# Patient Record
Sex: MALE | Race: WHITE
[De-identification: names, ages, dates, MRNs, and addresses within clinical notes are randomized per-mention and may not be internally consistent; named-entity substitution may affect disease eponyms.]

---

## 2018-12-19 ENCOUNTER — HOSPITAL ENCOUNTER (OUTPATIENT)
Dept: HOSPITAL 96 - M.ULTRA | Age: 73
End: 2018-12-19
Attending: FAMILY MEDICINE
Payer: MEDICARE

## 2018-12-19 DIAGNOSIS — R10.11: Primary | ICD-10-CM

## 2018-12-27 ENCOUNTER — HOSPITAL ENCOUNTER (OUTPATIENT)
Dept: HOSPITAL 96 - M.LAB | Age: 73
End: 2018-12-27
Attending: FAMILY MEDICINE
Payer: MEDICARE

## 2018-12-27 DIAGNOSIS — K76.0: Primary | ICD-10-CM

## 2018-12-27 DIAGNOSIS — R10.11: ICD-10-CM

## 2018-12-27 DIAGNOSIS — R93.5: ICD-10-CM

## 2018-12-27 DIAGNOSIS — K57.30: ICD-10-CM

## 2018-12-27 LAB
BUN SERPL-MCNC: 9 MG/DL (ref 7–18)
CREAT SERPL-MCNC: 0.7 MG/DL (ref 0.6–1.3)

## 2019-01-10 ENCOUNTER — HOSPITAL ENCOUNTER (OUTPATIENT)
Dept: HOSPITAL 96 - M.NUC | Age: 74
End: 2019-01-10
Attending: FAMILY MEDICINE
Payer: MEDICARE

## 2019-01-10 DIAGNOSIS — R93.5: ICD-10-CM

## 2019-01-10 DIAGNOSIS — R10.11: Primary | ICD-10-CM

## 2019-04-17 ENCOUNTER — HOSPITAL ENCOUNTER (OUTPATIENT)
Dept: HOSPITAL 96 - M.NUC | Age: 74
End: 2019-04-17
Attending: NURSE PRACTITIONER
Payer: MEDICARE

## 2019-04-17 DIAGNOSIS — R14.0: Primary | ICD-10-CM

## 2019-09-25 ENCOUNTER — HOSPITAL ENCOUNTER (EMERGENCY)
Dept: HOSPITAL 96 - M.ERS | Age: 74
Discharge: HOME | End: 2019-09-25
Payer: MEDICARE

## 2019-09-25 VITALS — HEIGHT: 72 IN | WEIGHT: 189.99 LBS | BODY MASS INDEX: 25.73 KG/M2

## 2019-09-25 VITALS — DIASTOLIC BLOOD PRESSURE: 79 MMHG | SYSTOLIC BLOOD PRESSURE: 108 MMHG
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
## 2019-09-25 DIAGNOSIS — Z98.890: ICD-10-CM

## 2019-09-25 DIAGNOSIS — M06.9: ICD-10-CM

## 2019-09-25 DIAGNOSIS — S16.1XXA: Primary | ICD-10-CM

## 2019-09-25 DIAGNOSIS — R42: ICD-10-CM
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
## 2019-09-25 LAB
ABSOLUTE BASOPHILS: 0.1 THOU/UL (ref 0–0.2)
ABSOLUTE EOSINOPHILS: 0 THOU/UL (ref 0–0.7)
ABSOLUTE MONOCYTES: 0.5 THOU/UL (ref 0–1.2)
ALBUMIN SERPL-MCNC: 3.5 G/DL (ref 3.4–5)
ALP SERPL-CCNC: 50 U/L (ref 46–116)
ALT SERPL-CCNC: 43 U/L (ref 30–65)
ANION GAP SERPL CALC-SCNC: 9 MMOL/L (ref 7–16)
APTT BLD: 22.7 SECONDS (ref 25–31.3)
AST SERPL-CCNC: 23 U/L (ref 15–37)
BASOPHILS NFR BLD AUTO: 1 %
BILIRUB SERPL-MCNC: 0.8 MG/DL
BUN SERPL-MCNC: 12 MG/DL (ref 7–18)
CALCIUM SERPL-MCNC: 8.8 MG/DL (ref 8.5–10.1)
CHLORIDE SERPL-SCNC: 103 MMOL/L (ref 98–107)
CO2 SERPL-SCNC: 28 MMOL/L (ref 21–32)
CREAT SERPL-MCNC: 0.6 MG/DL (ref 0.6–1.3)
EOSINOPHIL NFR BLD: 0.5 %
GLUCOSE SERPL-MCNC: 136 MG/DL (ref 70–99)
GRANULOCYTES NFR BLD MANUAL: 64.8 %
HCT VFR BLD CALC: 42.7 % (ref 42–52)
HGB BLD-MCNC: 14.4 GM/DL (ref 14–18)
INR PPP: 1
LYMPHOCYTES # BLD: 2.1 THOU/UL (ref 0.8–5.3)
LYMPHOCYTES NFR BLD AUTO: 27 %
MAGNESIUM SERPL-MCNC: 2.1 MG/DL (ref 1.8–2.4)
MCH RBC QN AUTO: 32.2 PG (ref 26–34)
MCHC RBC AUTO-ENTMCNC: 33.8 G/DL (ref 28–37)
MCV RBC: 95.5 FL (ref 80–100)
MONOCYTES NFR BLD: 6.7 %
MPV: 10 FL. (ref 7.2–11.1)
NEUTROPHILS # BLD: 5 THOU/UL (ref 1.6–8.1)
NT-PRO BRAIN NAT PEPTIDE: 65 PG/ML (ref ?–300)
NUCLEATED RBCS: 0 /100WBC
PLATELET COUNT*: 176 THOU/UL (ref 150–400)
POTASSIUM SERPL-SCNC: 3.6 MMOL/L (ref 3.5–5.1)
PROT SERPL-MCNC: 7 G/DL (ref 6.4–8.2)
PROTHROMBIN TIME: 10 SECONDS (ref 9.2–11.5)
RBC # BLD AUTO: 4.48 MIL/UL (ref 4.5–6)
RDW-CV: 15 % (ref 10.5–14.5)
SODIUM SERPL-SCNC: 140 MMOL/L (ref 136–145)
TROPONIN-I LEVEL: <0.06 NG/ML (ref ?–0.06)
WBC # BLD AUTO: 7.8 THOU/UL (ref 4–11)

## 2019-09-26 NOTE — EKG
[place] [address]
Phone:  [phone]                     ELECTROCARDIOGRAM REPORT      
_______________________________________________________________________________
 
Name:       LEONARDO PELAYO JR         [id number]      Account #:      [id number]  
Admission:  19     Attend Phys:                         
Discharge:  19     Date of Birth:  [birth year]  
         Report #: [id number]
    [id number]
_______________________________________________________________________________
THIS REPORT FOR:  //name//                      
 
                         [facility] ED
                                       
Test Date:    2019               Test Time:    15:56:13
Pat Name:     LEONARDO PELAYO              Department:   
Patient ID:   [id number]            Room:          
Gender:       M                        Technician:   
:          1945               Requested By: Yusuf Turk
Order Number: [id number] MD:   Mario Hand
                                 Measurements
Intervals                              Axis          
Rate:         71                       P:            43
MT:           163                      QRS:          -16
QRSD:         113                      T:            16
QT:           430                                    
QTc:          468                                    
                           Interpretive Statements
Sinus rhythm
Borderline intraventricular conduction delay
Borderline ST elevation, anterior leads
Compared to ECG 2017 08:21:31
ST (T wave) deviation now present
 
Electronically Signed On 2019 15:51:42 CDT by Mario Hand
[email or web address]
 
 
 
 
 
 
 
 
 
 
 
 
 
 
 
 
 
  <ELECTRONICALLY SIGNED>
                                           By: Mario Hand MD, [facility]   
  19     1551
D: 19 1556   _____________________________________
T: 19 1556   Mario Hand MD, [facility]     /EPI

## 2019-12-19 ENCOUNTER — APPOINTMENT (RX ONLY)
Dept: URBAN - METROPOLITAN AREA CLINIC 142 | Facility: CLINIC | Age: 74
Setting detail: DERMATOLOGY
End: 2019-12-19

## 2019-12-19 DIAGNOSIS — L82.1 OTHER SEBORRHEIC KERATOSIS: ICD-10-CM

## 2019-12-19 DIAGNOSIS — L21.8 OTHER SEBORRHEIC DERMATITIS: ICD-10-CM

## 2019-12-19 DIAGNOSIS — L57.0 ACTINIC KERATOSIS: ICD-10-CM

## 2019-12-19 PROBLEM — M12.9 ARTHROPATHY, UNSPECIFIED: Status: ACTIVE | Noted: 2019-12-19

## 2019-12-19 PROCEDURE — 17000 DESTRUCT PREMALG LESION: CPT

## 2019-12-19 PROCEDURE — 17003 DESTRUCT PREMALG LES 2-14: CPT

## 2019-12-19 PROCEDURE — 99202 OFFICE O/P NEW SF 15 MIN: CPT | Mod: 25

## 2019-12-19 PROCEDURE — ? LIQUID NITROGEN

## 2019-12-19 PROCEDURE — ? PRESCRIPTION

## 2019-12-19 PROCEDURE — ? COUNSELING

## 2019-12-19 RX ORDER — CLOBETASOL PROPIONATE 0.5 MG/ML
SOLUTION TOPICAL DAILY
Qty: 50 | Refills: 11 | Status: ERX | COMMUNITY
Start: 2019-12-19

## 2019-12-19 RX ORDER — KETOCONAZOLE 20 MG/ML
SHAMPOO TOPICAL WEEKLY
Qty: 120 | Refills: 11 | Status: ERX | COMMUNITY
Start: 2019-12-19

## 2019-12-19 RX ADMIN — KETOCONAZOLE: 20 SHAMPOO TOPICAL at 00:00

## 2019-12-19 RX ADMIN — CLOBETASOL PROPIONATE: 0.5 SOLUTION TOPICAL at 00:00

## 2019-12-19 ASSESSMENT — LOCATION SIMPLE DESCRIPTION DERM
LOCATION SIMPLE: RIGHT FOREHEAD
LOCATION SIMPLE: LEFT CHEEK
LOCATION SIMPLE: RIGHT EAR
LOCATION SIMPLE: POSTERIOR SCALP

## 2019-12-19 ASSESSMENT — LOCATION ZONE DERM
LOCATION ZONE: SCALP
LOCATION ZONE: FACE
LOCATION ZONE: EAR

## 2019-12-19 ASSESSMENT — LOCATION DETAILED DESCRIPTION DERM
LOCATION DETAILED: LEFT SUPERIOR PREAURICULAR CHEEK
LOCATION DETAILED: RIGHT SUPERIOR MEDIAL FOREHEAD
LOCATION DETAILED: RIGHT OCCIPITAL SCALP
LOCATION DETAILED: RIGHT SUPERIOR HELIX

## 2019-12-19 ASSESSMENT — PAIN INTENSITY VAS: HOW INTENSE IS YOUR PAIN 0 BEING NO PAIN, 10 BEING THE MOST SEVERE PAIN POSSIBLE?: NO PAIN

## 2019-12-19 NOTE — PROCEDURE: LIQUID NITROGEN
Render Post-Care Instructions In Note?: no
Number Of Freeze-Thaw Cycles: 2 freeze-thaw cycles
Duration Of Freeze Thaw-Cycle (Seconds): 10
Post-Care Instructions: I reviewed with the patient in detail post-care instructions. Patient is to wear sunprotection, and avoid picking at any of the treated lesions. Pt may apply Vaseline to crusted or scabbing areas.
Total Number Of Aks Treated: 3
Detail Level: Zone
Consent: The patient's verbal consent was obtained including but not limited to risks of crusting, scabbing, blistering, scarring, darker or lighter pigmentary change, recurrence, incomplete removal and infection.

## 2020-01-30 ENCOUNTER — HOSPITAL ENCOUNTER (OUTPATIENT)
Dept: HOSPITAL 96 - M.ULTRA | Age: 75
End: 2020-01-30
Attending: FAMILY MEDICINE
Payer: MEDICARE

## 2020-01-30 DIAGNOSIS — I70.213: Primary | ICD-10-CM

## 2020-04-16 ENCOUNTER — HOSPITAL ENCOUNTER (OUTPATIENT)
Dept: HOSPITAL 96 - M.ULTRA | Age: 75
End: 2020-04-16
Attending: FAMILY MEDICINE
Payer: MEDICARE

## 2020-04-16 DIAGNOSIS — E04.2: Primary | ICD-10-CM

## 2020-04-16 DIAGNOSIS — E89.0: ICD-10-CM

## 2020-04-16 DIAGNOSIS — J02.9: ICD-10-CM

## 2020-04-27 ENCOUNTER — HOSPITAL ENCOUNTER (OUTPATIENT)
Dept: HOSPITAL 96 - M.ULTRA | Age: 75
Discharge: HOME | End: 2020-04-27
Attending: FAMILY MEDICINE
Payer: MEDICARE

## 2020-04-27 DIAGNOSIS — E78.5: ICD-10-CM

## 2020-04-27 DIAGNOSIS — Z98.890: ICD-10-CM

## 2020-04-27 DIAGNOSIS — M10.9: ICD-10-CM

## 2020-04-27 DIAGNOSIS — F17.210: ICD-10-CM

## 2020-04-27 DIAGNOSIS — E04.1: Primary | ICD-10-CM

## 2020-04-27 DIAGNOSIS — Z79.899: ICD-10-CM

## 2020-04-28 NOTE — PATH
[address]  [address]                    PATHOLOGY RPT PROCEDURE       
_______________________________________________________________________________
 
Name:       LEONARDO PELAYO MELISSA JR         Room:                      REG CLI 
M.R.#:  [id number]      Account #:      [id number]  
Admission:  04/27/20     Date of Birth:  [birth year]  
Discharge:                             Report #:    [id number]
                                                         Path Case #: [id number]
_______________________________________________________________________________
Note
LCA Accession Number: [id number]
   TESTS               RESULT  FLAG  UNITS    REF RANGE  LAB
------------------------------------------------------------
   Clinician Provided Cytology Information
   No. of containers..01 Other (Miscellaneous)
Source:                                                   01
   LEFT THYROID
Clinician ICD10:                                          01
   E04.1
DIAGNOSIS:                                                02
   LEFT THYROID, IMAGE-GUIDED FNA:
   BETHESDA CATEGORY II: CONSISTENT WITH BENIGN FOLLICULAR NODULE.
   THIS INTERPRETATION INCLUDES EVALUATION OF A CELL BLOCK.
Signed out by:                                            02
   Abdifatah Tobin MD, Pathologist
   NPI- [id number]
Performed by:                                             Nabil Ledezma, Cytotechnologist ([facility])
Gross description:                                        01
   15ML, RED, 4FX 4 AD
   /LCS  04/27/2020  1613 Local
 
------------------------------------------------------------
    FLAG LEGEND:
    L-Low Normal,H-High Normal,LL-Alert Low,HH-Alert High
    <-Panic Low,>-Panic High,A-Abnormal,AA-Critical Abnormal
------------------------------------------------------------
 
Performed at:
[address]
   [place]  [address]
   James Cisse MD, Phone: [phone]
[address] [place] [address]
   [address], [place]  [address]
   Abdifatah Tobin MD, Phone: [phone]
***Performed at:  [address] [place] [address] [address], [place], [place]  [id number]
   MD James Cisse MD Phone:  [phone]

## 2020-10-15 ENCOUNTER — HOSPITAL ENCOUNTER (EMERGENCY)
Dept: HOSPITAL 96 - M.ERS | Age: 75
Discharge: HOME | End: 2020-10-15
Payer: MEDICARE

## 2020-10-15 VITALS — DIASTOLIC BLOOD PRESSURE: 70 MMHG | SYSTOLIC BLOOD PRESSURE: 130 MMHG

## 2020-10-15 VITALS — HEIGHT: 72 IN | WEIGHT: 189.99 LBS | BODY MASS INDEX: 25.73 KG/M2

## 2020-10-15 DIAGNOSIS — Z79.899: ICD-10-CM

## 2020-10-15 DIAGNOSIS — M06.9: ICD-10-CM

## 2020-10-15 DIAGNOSIS — R06.02: ICD-10-CM

## 2020-10-15 DIAGNOSIS — R00.2: Primary | ICD-10-CM

## 2020-10-15 LAB
ABSOLUTE BASOPHILS: 0.1 THOU/UL (ref 0–0.2)
ABSOLUTE EOSINOPHILS: 0 THOU/UL (ref 0–0.7)
ABSOLUTE MONOCYTES: 0.5 THOU/UL (ref 0–1.2)
ALBUMIN SERPL-MCNC: 3.6 G/DL (ref 3.4–5)
ALP SERPL-CCNC: 48 U/L (ref 46–116)
ALT SERPL-CCNC: 32 U/L (ref 30–65)
ANION GAP SERPL CALC-SCNC: 7 MMOL/L (ref 7–16)
AST SERPL-CCNC: 18 U/L (ref 15–37)
BASOPHILS NFR BLD AUTO: 1 %
BILIRUB SERPL-MCNC: 0.5 MG/DL
BUN SERPL-MCNC: 21 MG/DL (ref 7–18)
CALCIUM SERPL-MCNC: 8.6 MG/DL (ref 8.5–10.1)
CHLORIDE SERPL-SCNC: 103 MMOL/L (ref 98–107)
CO2 SERPL-SCNC: 27 MMOL/L (ref 21–32)
CREAT SERPL-MCNC: 0.9 MG/DL (ref 0.6–1.3)
EOSINOPHIL NFR BLD: 0.3 %
GLUCOSE SERPL-MCNC: 150 MG/DL (ref 70–99)
GRANULOCYTES NFR BLD MANUAL: 74.1 %
HCT VFR BLD CALC: 42.3 % (ref 42–52)
HGB BLD-MCNC: 14.5 GM/DL (ref 14–18)
LYMPHOCYTES # BLD: 1.6 THOU/UL (ref 0.8–5.3)
LYMPHOCYTES NFR BLD AUTO: 19 %
MCH RBC QN AUTO: 33 PG (ref 26–34)
MCHC RBC AUTO-ENTMCNC: 34.4 G/DL (ref 28–37)
MCV RBC: 96.1 FL (ref 80–100)
MONOCYTES NFR BLD: 5.6 %
MPV: 9.2 FL. (ref 7.2–11.1)
NEUTROPHILS # BLD: 6.3 THOU/UL (ref 1.6–8.1)
NT-PRO BRAIN NAT PEPTIDE: 66 PG/ML (ref ?–300)
NUCLEATED RBCS: 0 /100WBC
PLATELET COUNT*: 190 THOU/UL (ref 150–400)
POTASSIUM SERPL-SCNC: 3.9 MMOL/L (ref 3.5–5.1)
PROT SERPL-MCNC: 7.2 G/DL (ref 6.4–8.2)
RBC # BLD AUTO: 4.4 MIL/UL (ref 4.5–6)
RDW-CV: 14.4 % (ref 10.5–14.5)
SODIUM SERPL-SCNC: 137 MMOL/L (ref 136–145)
WBC # BLD AUTO: 8.4 THOU/UL (ref 4–11)

## 2020-10-15 NOTE — EKG
[place] [address]
Phone:  [phone]                     ELECTROCARDIOGRAM REPORT      
_______________________________________________________________________________
 
Name:         LEONARDO PELAYO JR        Room:                     REG ER 
M.R.#:    [id number]     Account #:     [id number]  
Admission:    10/15/20    Attend Phys:                     
Discharge:                Date of Birth: [birth year]  
Date of Service: 10/15/20 1407  Report #:      [id number]
        [id number]
_______________________________________________________________________________
THIS REPORT FOR:  //name//                      
 
                         [facility] ED
                                       
Test Date:    2020-10-15               Test Time:    14:07:39
Pat Name:     LEONARDO PELAYO              Department:   
Patient ID:   [id number]            Room:          
Gender:                               Technician:   BARBARA
:          1945               Requested By: Lico Ramos
Order Number: [id number] MD:   Mario Hand
                                 Measurements
Intervals                              Axis          
Rate:         87                       P:            66
LA:           156                      QRS:          -27
QRSD:         110                      T:            25
QT:           382                                    
QTc:          460                                    
                           Interpretive Statements
Sinus rhythm
Probable left atrial enlargement
Borderline left axis deviation
Baseline wander in lead(s) II,III,aVR,aVL,aVF,V2,V3,V5,V6
Compared to ECG 2019 15:56:13
ST (T wave) deviation no longer present
Electronically Signed On 10- 15:58:47 CDT by Mario Hand
[email or web address]
 
 
 
 
 
 
 
 
 
 
 
 
 
 
 
 
 
 
  <ELECTRONICALLY SIGNED>
                                           By: Mario Hand MD, FACC   
  10/15/20     1558
D: 10/15/20 1407   _____________________________________
T: 10/15/20 1407   Mario Hand MD, FACC     /EPI

## 2020-11-06 ENCOUNTER — HOSPITAL ENCOUNTER (OUTPATIENT)
Dept: HOSPITAL 96 - M.NUC | Age: 75
End: 2020-11-06
Attending: INTERNAL MEDICINE
Payer: MEDICARE

## 2020-11-06 DIAGNOSIS — I49.3: Primary | ICD-10-CM

## 2020-11-13 ENCOUNTER — HOSPITAL ENCOUNTER (OUTPATIENT)
Dept: HOSPITAL 96 - M.CL | Age: 75
End: 2020-11-13
Attending: INTERNAL MEDICINE
Payer: MEDICARE

## 2020-11-13 VITALS — DIASTOLIC BLOOD PRESSURE: 60 MMHG | SYSTOLIC BLOOD PRESSURE: 113 MMHG

## 2020-11-13 VITALS — WEIGHT: 178 LBS | HEIGHT: 69 IN | BODY MASS INDEX: 26.36 KG/M2

## 2020-11-13 VITALS — DIASTOLIC BLOOD PRESSURE: 59 MMHG | SYSTOLIC BLOOD PRESSURE: 107 MMHG

## 2020-11-13 VITALS — SYSTOLIC BLOOD PRESSURE: 103 MMHG | DIASTOLIC BLOOD PRESSURE: 53 MMHG

## 2020-11-13 VITALS — DIASTOLIC BLOOD PRESSURE: 53 MMHG | SYSTOLIC BLOOD PRESSURE: 94 MMHG

## 2020-11-13 VITALS — SYSTOLIC BLOOD PRESSURE: 130 MMHG | DIASTOLIC BLOOD PRESSURE: 78 MMHG

## 2020-11-13 VITALS — DIASTOLIC BLOOD PRESSURE: 61 MMHG | SYSTOLIC BLOOD PRESSURE: 106 MMHG

## 2020-11-13 VITALS — SYSTOLIC BLOOD PRESSURE: 113 MMHG | DIASTOLIC BLOOD PRESSURE: 66 MMHG

## 2020-11-13 VITALS — DIASTOLIC BLOOD PRESSURE: 58 MMHG | SYSTOLIC BLOOD PRESSURE: 106 MMHG

## 2020-11-13 DIAGNOSIS — Z83.3: ICD-10-CM

## 2020-11-13 DIAGNOSIS — M06.9: ICD-10-CM

## 2020-11-13 DIAGNOSIS — E78.2: ICD-10-CM

## 2020-11-13 DIAGNOSIS — Z88.1: ICD-10-CM

## 2020-11-13 DIAGNOSIS — Z98.890: ICD-10-CM

## 2020-11-13 DIAGNOSIS — R94.39: Primary | ICD-10-CM

## 2020-11-13 DIAGNOSIS — Z79.899: ICD-10-CM

## 2020-11-13 DIAGNOSIS — I49.3: ICD-10-CM

## 2020-11-13 DIAGNOSIS — I25.10: ICD-10-CM

## 2020-11-13 LAB
ALBUMIN SERPL-MCNC: 3.4 G/DL (ref 3.4–5)
ALP SERPL-CCNC: 47 U/L (ref 46–116)
ALT SERPL-CCNC: 33 U/L (ref 30–65)
ANION GAP SERPL CALC-SCNC: 5 MMOL/L (ref 7–16)
APTT BLD: 23.5 SECONDS (ref 25–31.3)
AST SERPL-CCNC: 20 U/L (ref 15–37)
BILIRUB SERPL-MCNC: 0.5 MG/DL
BUN SERPL-MCNC: 11 MG/DL (ref 7–18)
CALCIUM SERPL-MCNC: 8.3 MG/DL (ref 8.5–10.1)
CHLORIDE SERPL-SCNC: 108 MMOL/L (ref 98–107)
CHOLEST SERPL-MCNC: 230 MG/DL (ref ?–200)
CO2 SERPL-SCNC: 34 MMOL/L (ref 21–32)
CREAT SERPL-MCNC: 0.8 MG/DL (ref 0.6–1.3)
GLUCOSE SERPL-MCNC: 96 MG/DL (ref 70–99)
HCT VFR BLD CALC: 42.8 % (ref 42–52)
HDLC SERPL-MCNC: 49 MG/DL (ref 40–?)
HGB BLD-MCNC: 14.4 GM/DL (ref 14–18)
INR PPP: 1
LDLC SERPL-MCNC: 153 MG/DL (ref ?–100)
MCH RBC QN AUTO: 32.6 PG (ref 26–34)
MCHC RBC AUTO-ENTMCNC: 33.7 G/DL (ref 28–37)
MCV RBC: 96.9 FL (ref 80–100)
MPV: 9.2 FL. (ref 7.2–11.1)
PLATELET COUNT*: 202 THOU/UL (ref 150–400)
POTASSIUM SERPL-SCNC: 3.5 MMOL/L (ref 3.5–5.1)
PROT SERPL-MCNC: 6.9 G/DL (ref 6.4–8.2)
PROTHROMBIN TIME: 10.3 SECONDS (ref 9.2–11.5)
RBC # BLD AUTO: 4.41 MIL/UL (ref 4.5–6)
RDW-CV: 14.1 % (ref 10.5–14.5)
SODIUM SERPL-SCNC: 147 MMOL/L (ref 136–145)
TC:HDL: 4.7 RATIO
TRIGL SERPL-MCNC: 144 MG/DL (ref ?–150)
VLDLC SERPL CALC-MCNC: 29 MG/DL (ref ?–40)
WBC # BLD AUTO: 8.2 THOU/UL (ref 4–11)

## 2020-11-13 NOTE — EKG
[place] [address]
Phone:  [phone]                     ELECTROCARDIOGRAM REPORT      
_______________________________________________________________________________
 
Name:         LEONARDO PELAYO JR        Room:                     REG CLI
M.R.#:    [id number]     Account #:     [id number]  
Admission:    20    Attend Phys:   JEFFRY Coyle
Discharge:                Date of Birth: [birth year]  
Date of Service: 20 1040  Report #:      [id number]
        [id number]
_______________________________________________________________________________
THIS REPORT FOR:  //name//                      
 
                          [facility]
                                       
Test Date:    2020               Test Time:    10:40:34
Pat Name:     LEONARDO PELAYO              Department:   
Patient ID:   [id number]            Room:          
Gender:                               Technician:   
:          1945               Requested By: Carlos Trinh
Order Number: [id number]    Rosy MD:   Mario Hand
                                 Measurements
Intervals                              Axis          
Rate:         67                       P:            42
MS:           157                      QRS:          -11
QRSD:         109                      T:            46
QT:           420                                    
QTc:          444                                    
                           Interpretive Statements
Sinus rhythm
Compared to ECG 10/15/2020 14:07:39
No significant changes
Electronically Signed On 2020 17:05:25 CST by Mario Hand
[email or web address]
 
 
 
 
 
 
 
 
 
 
 
 
 
 
 
 
 
 
 
 
 
  <ELECTRONICALLY SIGNED>
                                           By: Mario Hand MD, [facility]   
  20     1705
D: 20 1040   _____________________________________
T: 200   Mario Hand MD, FACC     /EPI

## 2020-11-13 NOTE — CARD
[address]  [address]                    CARDIAC CATH REPORT           
_______________________________________________________________________________
 
Name:       LEONARDO PELAYO JR         Room:                      REG CLI 
M.RMrya#:  [id number]      Account #:      [id number]  
Admission:  11/13/20     Attend Phys:    Carlos Trinh MD, 
Discharge:               Date of Birth:  [birth year]  
         Report #: [id number]
                                                                     [id number]
_______________________________________________________________________________
THIS REPORT FOR:  //name//                      
 
cc:  José Luis Martins Adam J DO                                                      ~
 
--------------- APPROVED REPORT --------------
 
 
Study performed:  11/13/2020 11:07:00
 
Patient Details
Patient Status: Out-Patient                  Room #: 
The patient is a 75 year-old male
 
Event Personnel
Carlos Trinh  Interventional Cardiologist, Charli Carter RN 
Circulator, Wayne Rome ScrubJeffrey Becki RTR 
Monitor
 
Procedures Performed
Art Access - R femoral artery, Left Heart Cath w/or w/o Coronaries 
LHC, Hemostasis w/ Mynx
 
Indication
Positive stress test
 
Risk Factors
Hypercholesterolemia
 
Procedure Narrative
The patient was brought electively to the Cardiac Catheterization 
Laboratory and was prepped and draped in a sterile manner. The right 
femoral was infiltrated with 2% Lidocaine subcutaneous anesthesia. A 
6F [place] sheath was inserted into the right femoral artery. 
Coronary angiography was performed using coronary diagnostic 
catheters. The right coronary system was accessed and visualized with 
a 6F JR4 catheter. The left coronary system was accessed and 
visualized with a 6F JL4 catheter. The left ventricle was accessed 
and visualized with a 6F Straight Pigtail catheter. Left 
ventricular/Aortic Valve gradient assessed via catheter pullback. 
Left ventriculogram was performed in SULLIVAN projection. Pre-demployment 
femoral angiogram was performed . Closure device was deployed with a 
6 Fr Mynx. The patient tolerated the procedure well and there were no 
complications associated with the procedure. There was no 
hematoma.
 
 
 
 
[place] [address]                    CARDIAC CATH REPORT           
_______________________________________________________________________________
 
Name:       LEONARDO PELAYO JR         Room:                      REG CLI 
M.R.#:  [id number]      Account #:      [id number]  
Admission:  11/13/20     Attend Phys:    Carlos Trinh MD, 
Discharge:               Date of Birth:  [birth year]  
         Report #: [id number]
                                                                     [id number]
_______________________________________________________________________________
Intraoperative Conscious Sedation
Sedation start time:  11:38           Case end Time:  
12:03    
Fentanyl  50 mcg    Versed  2 mg  
 
Fluoro Time:    3.5 minutes    
Dose:     DAP 71346 cGycm2  632 mGy  
Contrast Type and Amount:  Visipaque 110 ml   
 
Coronary Angiography
The patient's coronary anatomy is right dominant. 
 
Diagnostic Cath
Left Main 0% narrowing
LAD  0% narrowing of the LAD with 30 % mid first diagonal 
narrowing
Circumflex 0% narrowing
Right Coronary Large dominant vessel with 30% proximal and distal 
narrowings
 
Left Ventriculography
The left ventricle is normal in size with normal contractility. The 
left ventricular ejection fraction is estimated to be 60%. Left 
ventricular wall motion abnormalities are not present. There is no 
mitral insufficiency.
 
Hemodynamics
The aortic pressure is 102/57 mmHg with a mean of 70 mmHg. The left 
ventricular pressure is 102/2 mmHg with a mean of mmHg. The left 
ventricular end diastolic pressure is 8 mmHg. 
 
Conclusion
1.  Mild coronary artery disease characterized by the following:
 
A 30% narrowing of the midportion of the first diagonal branch of the 
LAD
 
B 30% proximal and distal narrowings in the large dominant right 
coronary artery
 
C normal left main and circumflex coronary arteries
 
2.  Normal left ventricular systolic function, estimated ejection 
fraction 60%
 
3.  Normal left-sided hemodynamic study 
 
 
 
[place] [address]                    CARDIAC CATH REPORT           
_______________________________________________________________________________
 
Name:       LEONARDO PELAYO JR         Room:                      REG CLI 
M.R.#:  [id number]      Account #:      [id number]  
Admission:  11/13/20     Attend Phys:    Carlos Trinh MD, 
Discharge:               Date of Birth:  [birth year]  
         Report #: [id number]
                                                                     [id number]
_______________________________________________________________________________
 
Recommendations
Cardiac Risk Reduction Program
 
Diagnostic Cath Approved by: Carlos Trinh MD        Date/Time: 
11/13/2020 13:21:48
 
 
 
 
 
 
 
 
 
 
 
 
 
 
 
 
 
 
 
 
 
 
 
 
 
 
 
 
 
 
 
 
 
 
 
 
 
 
<ELECTRONICALLY SIGNED>
                                        By:  Carlos Trinh MD, FACC     
11/13/20     1322
D: 11/13/20 1322_______________________________________
T: 11/13/20 1322Carlos Trinh MD, FACC        /INF

## 2021-01-01 NOTE — CARDNUC
[place] [address]
Phone:  [phone]                     CARDIAC NUCLEAR IMAGING REPORT
_______________________________________________________________________________
 
Name:         LEONARDO PELAYO JR        Room:                     REG CLI
M.R.#:    [id number]     Account #:     [id number]  
Admission:    11/06/20    Attend Phys:   JOE Collins, 
Discharge:                Date of Birth: [birth year]  
Date of Service: 11/06/20 1803  Report #:      [id number]
        [id number] 
_______________________________________________________________________________
THIS REPORT FOR:
 
cc:  José Luis Martins Adam J DO Liston, Michael J. MD [facility]     
                                                                       ~
 
--------------- APPROVED REPORT --------------
 
 
Imaging Protocol: Stress Tc-99m/Rest Tc-99m 1 day
Study performed:  11/06/2020 12:30:00
 
Indication: Palpitations
Patient Location: Out-Patient
Stress Tech: Ting Ball
Stress Nurse: Mago Hardin RN
 
Ht: 6 ft 0 in  Wt: 194 lbs  BSA:  2.10 m2
    BMI:  26.30
 
Medical History
Medical History: CAD non obstructive, Hyperlipidemia
Medications: no cardiac meds
Allergies: cephalexin
Cardiac Risk Factors: Age, Hyperlipidemia, Past Smoker
Exercise History: Indeterminate
 
Resting Data
Rest SPECT myocardial perfusion imaging was performed in supine 
position 30 minutes following the intravenous injection of 11.5 mCi 
of Tc-99m Sestamibi.
Time of rest injection: 12:45     
The images were gated to evaluate regional wall motion and calculate 
left ventricular ejection fraction. 
Administration Route: IV
Administration Site: Right AC
 
Pharmacologic Stress
Pharmacologic stress test was performed by injecting Regadenoson 0.4 
mg IV push over 10-15 seconds immediately followed by the intravenous 
injection of 31.7 mCi of Tc-99m Sestamibi.
Time of stress injection: 31.7     
Administration Route: IV
Administration Site: Right AC
Heart Rate at time of stress injection: 117 bpm.
 
 
[place] [address]
Phone:  [phone]                     CARDIAC NUCLEAR IMAGING REPORT
_______________________________________________________________________________
 
Name:         LEONAROD PELAYO JR        Room:                     REG CLI
M.R.#:    [id number]     Account #:     [id number]  
Admission:    11/06/20    Attend Phys:   JOE Collins, 
Discharge:                Date of Birth: [birth year]  
Date of Service: 11/06/20 1803  Report #:      [id number]
        [id number] 
_______________________________________________________________________________
Gated Stress SPECT was performed 45 minutes after stress 
injection.
The images were gated to evaluate regional wall motion and calculate 
left ventricular ejection fraction. 
 
Stress Test Details
Stress Test:  Pharmacologic stress was paired with low level 
exercise.      
  Reason for pharmacologic stress test: arthritis.      
 
HR      Max Heart Rate (APMHR): 148 bpm  
Resting HR:            82 bpm   Target HR (85% APMHR): 125 bpm  
Max HR Achieved:  140 bpm        
% of APMHR:         94         
Recovery HR:            103 bpm        
 
BP           
Resting BP:  109/66 mmHg        
Max BP:       160/78 mmHg        
Recovery BP:       126/74 mmHg        
ECG           
Resting ECG:  Sinus Rhythm        
Stress ECG:     Sinus Tachycardia       
ST Change: None          
Arrhythmia:    VPC's         
Recovery ECG: Sinus Rhythm        
Recovery ST Change: None        
Recovery Arrhythmia: VPC's        
 
Clinical
Reason for Termination: Completed protocol
The patient tolerated walking Lexiscan protocol without significant 
cardiac symptoms.
 
Nurse Comments
test changed from treadmill stress to walking tom per nurse 
judgement of patients abilities
 
Stress ECG Conclusion
The baseline twelve-lead EKG shows sinus rhythm without significant 
ST segment or T wave abnormalities.  EKGs obtained during and post 
walking Lexiscan protocol show sinus rhythm and sinus tachycardia 
with no significant ST segment changes when compared to baseline.  
There are occasional unifocal premature ventricular contractions 
noted.
 
 
 
[place] [address]
Phone:  [phone]                     CARDIAC NUCLEAR IMAGING REPORT
_______________________________________________________________________________
 
Name:         LEONARDO PELAYO JR        Room:                     REG CLI
M.R.#:    [id number]     Account #:     [id number]  
Admission:    11/06/20    Attend Phys:   JOE Collins, 
Discharge:                Date of Birth: [birth year]  
Date of Service: 11/06/20 1803  Report #:      [id number]
        [id number] 
_______________________________________________________________________________
Study Quality
Study: Good
Artifact: Mild Diaphragmatic artifact
 
Study Data
At rest, the left ventricular ejection fraction was 62%..   
Post stress, the left ventricular ejection was 65%..   
TID = 0.98.       
 
Perfusion
Perfusion images show a moderate sized moderate intensity reversible 
defect involving the basal to mid inferior wall.  No other 
significant fixed or reversible defects are identified.
 
Wall Motion
The basal portion of the inferior wall appears hypokinetic.  There is 
a septal wall motion abnormality noted of uncertain significance.  
Global LV systolic function is preserved.
 
Nuclear Conclusion
ECG Findings: negative for ischemia 
Clinical Findings: negative for ischemia 
Nuclear Findings: positive for ischemia 
Exercise Capacity: not assessed
Left Ventricular Function: Preserved 
Risk Study: high
Perfusion images suggest stress-induced ischemia involving the basal 
to mid inferior wall.  Global LV systolic function is preserved with 
wall motion abnormalities as outlined above. 
 
<Conclusion>
The baseline twelve-lead EKG shows sinus rhythm without significant 
ST segment or T wave abnormalities.  EKGs obtained during and post 
walking Lexiscan protocol show sinus rhythm and sinus tachycardia 
with no significant ST segment changes when compared to baseline.  
There are occasional unifocal premature ventricular contractions 
noted.
 
 
 
 
 
 
 
  <ELECTRONICALLY SIGNED>
                                           By: Mario Hand MD, FACC   
  11/06/20 1803
D: 11/06/20 1803   _____________________________________
T: 11/06/20 1803   Mario Hand MD, FACC     /INF [id number] Detailed

## 2021-06-15 ENCOUNTER — HOSPITAL ENCOUNTER (OUTPATIENT)
Dept: HOSPITAL 96 - M.RAD | Age: 76
End: 2021-06-15
Attending: FAMILY MEDICINE
Payer: MEDICARE

## 2021-06-15 DIAGNOSIS — M81.0: Primary | ICD-10-CM

## 2021-06-15 DIAGNOSIS — M85.80: ICD-10-CM

## 2022-02-23 ENCOUNTER — HOSPITAL ENCOUNTER (OUTPATIENT)
Dept: HOSPITAL 96 - M.CT | Age: 77
End: 2022-02-23
Attending: FAMILY MEDICINE
Payer: MEDICARE

## 2022-02-23 DIAGNOSIS — M41.84: Primary | ICD-10-CM

## 2022-02-23 DIAGNOSIS — J43.8: ICD-10-CM

## 2022-02-23 DIAGNOSIS — R91.8: ICD-10-CM

## 2022-02-23 DIAGNOSIS — R07.81: ICD-10-CM
